# Patient Record
(demographics unavailable — no encounter records)

---

## 2025-02-10 NOTE — ED.PDOC
History of Present Illness


HPI Comments


31 year old female presents to the ED with chief complaint of flu-like illness. 

Patient reports that she has been experiencing a productive cough with 

associated nasal congestion, fever, and chills for the past 2 weeks. Patient 

relays that she had been seen by DWAIN a few days ago and was prescribed cough 

medicine along with Tylenol and Ibuprofen with little to no relief being noted. 

Patient denies any N/V/D, dizziness, chest pain, SOB, or abdominal pain.


Chief Complaint:  Flu like


Time Seen by MD:  07:44


Primary Care Provider:  [facility]


Reviewed Notes:  Nurses Notes, Medications, Allergies


Allergies:  


Coded Allergies:  


     NO KNOWN ALLERGIES (Unverified , 6/16/22)


Home Meds


Active Scripts


Ibuprofen Micronized (Ibuprofen) 800 Mg Tab, 800 MG PO TID PRN, #30 TAB


   Prov:PRASANNA PARKER         7/7/22


Sulfamethoxazole-Trimethoprim (Bactrim) 1 Tab Tab, 1 TAB PO BID for 10 Days, #20

TAB


   Prov:PRASANNA PARKER         7/7/22


Reported Medications


Acetaminophen W/ Codeine (Tylenol W/Cod #3) 1 Tab Tb, 1 TAB PO Q6HPRN PRN for 10

Days, #40 TAB


   6/15/22


Acetaminophen W/ Codeine (Tylenol W/Cod #3) 1 Tab Tb, 1 TAB PO Q6HPRN PRN for 10

Days, #40 TAB


   6/15/22


Information Source:  Patient, Spouse


Mode of Arrival:  Ambulatory


Severity:  Moderate


Timing:  Weeks


Duration:  Since onset


Prehospital treatment:  None





Past Medical History


PAST MEDICAL HISTORY:  Denies


Surgical History:  Denies all surgeries


GYN History:  No Pertinent GYN History





Family History


Family History:  Reviewed,noncontributory to illness, No family hx of Cancer, No

family hx of DM, No family hx of Heart dominick, No family hx of HTN, No family hx 

ofKidney dominick, No family hx of Liver dominick, No family hx of Lung dominick, No family hx 

of Stroke





Social History


Smoker:  Non-Smoker


Alcohol:  Denies ETOH Use


Drugs:  Denies Drug Use


Lives In:  Home





Constitutional:  reports: chills, fever; denies: diaphoresis, fatigue, malaise, 

sweats, weakness, others


EENTM:  reports: nose congestion; denies: blurred vision, double vision, ear 

bleeding, ear discharge, ear drainage, ear pain, ear ringing, eye pain, eye 

redness, hearing loss, mouth pain, mouth swelling, nasal discharge, nose 

bleeding, nose pain, photophobia, tearing, throat pain, throat swelling, voice 

changes, others


Respiratory:  reports: cough; denies: hemoptysis, orthopnea, SOB at rest, 

shortness of breath, SOB with excertion, stridor, wheezing, others


Cardiovascular:  denies: chest pain, dizzy spells, diaphoresis, Dyspnea on 

exertion, edema, irregular heart beat, left arm pain, lightheadedness, 

palpitations, PND, syncope, others


Gastrointestinal:  denies: abdomen distended, abdominal pain, blood streaked 

bowels, constipated, diarrhea, dysphagia, difficulty swallowing, hematemesis, 

melena, nausea, poor appetite, poor fluid intake, rectal bleeding, rectal pain, 

vomiting, others


Genitourinary:  denies: abnormal vagina bleeding, burning, dyspareunia, dysuria,

flank pain, frequency, hematuria, incontinence, pain, pregnant, vagina 

discharge, urgency, others


Neurological:  denies: dizziness, fainting, headache, left sided numbness, left 

sided weakness, numbness, paresthesia, pre-existing deficit, right sided num

bness, right sided weakness, seizure, speech problems, tingling, tremors, 

weakness, others


Musculoskeletal:  denies: back pain, gout, joint pain, joint swelling, muscle 

pain, muscle stiffness, neck pain, others


Integumetry:  denies: bruises, change in color, change in hair/nails, dryness, 

laceration, lesions, lumps, rash, wounds, others


Allergic/Immunocompromised:  denies: Difficulty Healing, Frequent Infections, 

Hives, Itching, others


Hematologic/Lymphatic:  denies: anemia, blood clots, easy bleeding, easy 

bruising, swollen glands, others


Endocrine:  denies: excessive hunger, excessive sweating, excessive thirst, 

excessive urination, flushing, intolerance to cold, intolerance to heat, 

unexplained weight gain, unexplained weight loss, others


Psychiatric:  denies: anxiety, bipolar disorder, depression, hopeless, panic 

disorder, schizophrenia, sleepless, suicidal, others


All Other Systems:  Reviewed and Negative





Physical Exam


General Appearance:  No Apparent Distress, Normal


HEENT:  Normal ENT Inspection, PERRL/EOMI, Pharynx Normal, TMs Normal, Other 

(Nasal congestion)


Neck:  Full Range of Motion, Non-Tender, Normal, Normal Inspection


Respiratory:  Chest Non-Tender, Lungs Clear, No Accessory Muscle Use, No 

Respiratory Distress, Normal Breath Sounds


Cardiovascular:  No Edema, No JVD, No Murmur, No Gallop, Normal Peripheral 

Pulses, Regular Rate/Rhythm


Breast Exam:  Deferred


Gastrointestinal:  No Organomegaly, Non Tender, No Pulsatile Mass, Normal Bowel 

Sounds, Soft


Genitalia:  Deferred


Pelvic:  Deferred


Rectal:  Deferred


Extremities:  No calf tenderness, Normal capillary refill, Normal inspection, 

Normal range of motion, Non-tender, No pedal edema


Musculoskeletal :  


   Apperance:  Normal


Neurologic:  Alert, CNs II-XII nml as Tested, No Motor Deficits, Normal Affect, 

Normal Mood, No Sensory Deficits


Cerebellar Function:  Normal


Reflexes:  Normal


Skin:  Dry, Normal Color, Warm


Lymphatic:  No Adenopathy





Was a procedure done?


Was a procedure done?:  No





Differential Dx


Considerations may include:


Viral syndrome, pneumonia





X-Ray, Labs, Meds, VS





                                   Vital Signs








  Date Time  Temp Pulse Resp B/P (MAP) Pulse Ox O2 Delivery O2 Flow Rate FiO2


 


2/10/25 07:41 97.9 95 19 132/95 (107) 95   


 


2/10/25 07:25 97.4 95 19 132/95 (107) 95   





 97.4       


 


2/10/25 07:25  95 19  95 Room Air  








                               Current Medications








 Medications


  (Trade)  Dose


 Ordered  Sig/Dl


 Route  Start Time


 Stop Time Status Last Admin


 


 Acetaminophen


  (Tylenol Tablet)  650 mg  ONCE  ONCE


 PO  2/10/25 07:45


 2/10/25 07:46 DC 2/10/25 08:18





 


 Dexamethasone


 Sodium Phosphate


  (Decadron


 Injection)  10 mg  ONCE  ONCE


 PO  2/10/25 07:45


 2/10/25 07:46 DC 2/10/25 08:18











Time of 1ST Reevaluation:  08:44


Reevaluation 1ST:  Unchanged


Patient Education/Counseling:  Diagnosis, Treatment


Family Education/Counseling:  Diagnosis, Treatment


Additional Information


I reviewed the following notes from patient's past medical encounters: 7/7/22 

for wound check





The following tests were ordered, and results were reviewed by me: Chest XR





I reviewed and agreed with the following test results read by other providers: 

chest XR





Additional Information was gathered from interviewing the following independent 

historians: Spouse





I discussed treatment and results with medical personnel and family.





Departure 1


Departure


Time of Disposition:  11:33 (Patient likely with virus.  We will discharge 

patient home with outpatient follow)


Impression:  


   Primary Impression:  


   Acute viral syndrome


Disposition:  01 HOME / SELF CARE / HOMELESS


Condition:  Stable





Additional Instructions:  


You likely have a viral illness.


It is important to stay well rested and well hydrated.


You can take Tylenol and Motrin as needed for pain and fever.


For a sore throat you can drink warm tea with honey.


You can take over-the-counter pseudoephedrine for nasal congestion.


He should follow up with your regular doctor within 1 week to ensure you are 

doing better.


If your symptoms worsen or you have any other concerns please return to the 

emergency room.


Discharged With:  Self





Critical Care Note


Critical Care Time?:  No





Stability


Stability form required:  No





Heart Score


Heart Score:  








Heart Score Response (Comments) Value


 


History N/A 0


 


EKG N/A 0


 


Age N/A 0


 


Risk Factors N/A 0


 


Troponin N/A 0


 


Total  0














I personally scribed for ZAY WATSON MD ([email or web address]) on 2/10/25 at 07:46.  

Electronically submitted by Rajiv Azevedo ([email or web address]).





ZAY WATSON MD               Feb 10, 2025 07:46